# Patient Record
Sex: FEMALE | Employment: FULL TIME | URBAN - NONMETROPOLITAN AREA
[De-identification: names, ages, dates, MRNs, and addresses within clinical notes are randomized per-mention and may not be internally consistent; named-entity substitution may affect disease eponyms.]

---

## 2024-01-26 ENCOUNTER — HOSPITAL ENCOUNTER (EMERGENCY)
Facility: HOSPITAL | Age: 19
Discharge: HOME | End: 2024-01-27
Attending: EMERGENCY MEDICINE
Payer: COMMERCIAL

## 2024-01-26 ENCOUNTER — APPOINTMENT (EMERGENCY)
Dept: RADIOLOGY | Facility: HOSPITAL | Age: 19
End: 2024-01-26
Payer: COMMERCIAL

## 2024-01-26 VITALS
RESPIRATION RATE: 18 BRPM | TEMPERATURE: 98.6 F | DIASTOLIC BLOOD PRESSURE: 79 MMHG | WEIGHT: 102 LBS | OXYGEN SATURATION: 96 % | HEART RATE: 126 BPM | SYSTOLIC BLOOD PRESSURE: 138 MMHG

## 2024-01-26 DIAGNOSIS — R06.2 WHEEZING: ICD-10-CM

## 2024-01-26 DIAGNOSIS — J45.41 MODERATE PERSISTENT ASTHMA WITH EXACERBATION: Primary | ICD-10-CM

## 2024-01-26 DIAGNOSIS — R11.0 NAUSEA: ICD-10-CM

## 2024-01-26 LAB
FLUAV RNA RESP QL NAA+PROBE: NEGATIVE
FLUBV RNA RESP QL NAA+PROBE: NEGATIVE
RSV RNA RESP QL NAA+PROBE: NEGATIVE
SARS-COV-2 RNA RESP QL NAA+PROBE: NEGATIVE

## 2024-01-26 PROCEDURE — 94760 N-INVAS EAR/PLS OXIMETRY 1: CPT

## 2024-01-26 PROCEDURE — 0241U HB NFCT DS VIR RESP RNA 4 TRGT: CPT | Performed by: EMERGENCY MEDICINE

## 2024-01-26 PROCEDURE — 71045 X-RAY EXAM CHEST 1 VIEW: CPT

## 2024-01-26 PROCEDURE — 94664 DEMO&/EVAL PT USE INHALER: CPT

## 2024-01-26 PROCEDURE — 94644 CONT INHLJ TX 1ST HOUR: CPT

## 2024-01-26 RX ORDER — SODIUM CHLORIDE FOR INHALATION 0.9 %
12 VIAL, NEBULIZER (ML) INHALATION ONCE
Status: COMPLETED | OUTPATIENT
Start: 2024-01-26 | End: 2024-01-26

## 2024-01-26 RX ORDER — ONDANSETRON 4 MG/1
4 TABLET, ORALLY DISINTEGRATING ORAL ONCE
Status: COMPLETED | OUTPATIENT
Start: 2024-01-27 | End: 2024-01-27

## 2024-01-26 RX ORDER — ALBUTEROL SULFATE 90 UG/1
2 AEROSOL, METERED RESPIRATORY (INHALATION) EVERY 6 HOURS PRN
Qty: 18 G | Refills: 2 | Status: SHIPPED | OUTPATIENT
Start: 2024-01-26

## 2024-01-26 RX ORDER — PREDNISONE 20 MG/1
40 TABLET ORAL DAILY
Qty: 10 TABLET | Refills: 2 | Status: SHIPPED | OUTPATIENT
Start: 2024-01-26

## 2024-01-26 RX ADMIN — Medication 12 ML: at 22:35

## 2024-01-26 RX ADMIN — PREDNISONE 50 MG: 20 TABLET ORAL at 22:30

## 2024-01-26 RX ADMIN — ALBUTEROL SULFATE 10 MG: 2.5 SOLUTION RESPIRATORY (INHALATION) at 22:35

## 2024-01-26 RX ADMIN — IPRATROPIUM BROMIDE 1 MG: 0.5 SOLUTION RESPIRATORY (INHALATION) at 22:35

## 2024-01-27 RX ORDER — ONDANSETRON 4 MG/1
4 TABLET, ORALLY DISINTEGRATING ORAL EVERY 8 HOURS PRN
Qty: 20 TABLET | Refills: 0 | Status: SHIPPED | OUTPATIENT
Start: 2024-01-27

## 2024-01-27 RX ADMIN — ONDANSETRON 4 MG: 4 TABLET, ORALLY DISINTEGRATING ORAL at 00:07

## 2024-01-27 NOTE — DISCHARGE INSTRUCTIONS
Take prednisone for the next 5 days.  Use inhaler as needed 2 puffs every 4-6 hours  Contact  clinic to establish care with a PCP and/or contact [facility] pulmonary medicine to establish follow-up with a respiratory specialist as they may be able to provide chronic follow-up options, further medications, refills, nebulizers.    Prescription for albuterol inhaler, steroid, spacer for inhaler and referrals for PCP and pulmonary medicine ordered.

## 2024-01-27 NOTE — ED PROVIDER NOTES
History  Chief Complaint   Patient presents with    Shortness of Breath     Pt states that she has been having bad asthma lately with increased SOB today. Pt states she is unable to sleep at night without having an asthma attack. Complains of chest pain        Shortness of Breath  Associated symptoms: cough and wheezing    Associated symptoms: no chest pain, no fever, no headaches, no sore throat and no vomiting    This is a 18-year-old female she reports a past medical history significant for asthma she has been using over-the-counter inhaler for some time now.  She denies a history of admissions, BiPAP, intubations.  She denies tobacco use.  She reports she has been having chronic asthma symptoms including wheezing, shortness of breath, nocturnal coughing throughout the winter season beginning around November.  She denies any other changes in health.  She has been using the inhaler with some relief has not sought any recent evaluations by pulmonology or PCP.  Feels like her symptoms have worsened in the last several days.  Denies productive cough denies fevers, chills denies chest pain denies syncope lightheadedness, dizziness, leg swelling.  Denies history of venous thromboembolism.    None       History reviewed. No pertinent past medical history.    History reviewed. No pertinent surgical history.    History reviewed. No pertinent family history.  I have reviewed and agree with the history as documented.    E-Cigarette/Vaping    E-Cigarette Use Never User      E-Cigarette/Vaping Substances     Social History     Tobacco Use    Smoking status: Never    Smokeless tobacco: Never   Vaping Use    Vaping status: Never Used   Substance Use Topics    Alcohol use: Never    Drug use: Yes     Types: Marijuana       Review of Systems   Constitutional:  Positive for activity change. Negative for chills and fever.   HENT:  Negative for congestion, sore throat and voice change.    Respiratory:  Positive for cough, chest  tightness, shortness of breath and wheezing. Negative for apnea, choking and stridor.    Cardiovascular:  Negative for chest pain, palpitations and leg swelling.   Gastrointestinal:  Negative for nausea and vomiting.   Neurological:  Negative for dizziness, syncope, light-headedness, numbness and headaches.       Physical Exam  Physical Exam  Vitals and nursing note reviewed.   Constitutional:       General: She is not in acute distress.     Appearance: She is well-developed. She is not ill-appearing, toxic-appearing or diaphoretic.   HENT:      Head: Normocephalic and atraumatic.      Mouth/Throat:      Mouth: Mucous membranes are moist.      Pharynx: Oropharynx is clear.   Eyes:      Extraocular Movements: Extraocular movements intact.      Pupils: Pupils are equal, round, and reactive to light.   Neck:      Vascular: No JVD.      Trachea: No tracheal deviation.   Cardiovascular:      Rate and Rhythm: Regular rhythm. Tachycardia present.      Heart sounds: No murmur heard.     No friction rub. No gallop.   Pulmonary:      Effort: Accessory muscle usage present. No tachypnea or respiratory distress.      Breath sounds: No stridor. Examination of the right-upper field reveals wheezing. Examination of the left-upper field reveals wheezing. Examination of the right-middle field reveals wheezing. Examination of the left-middle field reveals wheezing. Examination of the right-lower field reveals wheezing. Examination of the left-lower field reveals wheezing. Wheezing present. No decreased breath sounds, rhonchi or rales.      Comments: Speaking in full sentences.  Lies supine without difficulty (no orthopnea).   Chest:      Chest wall: No tenderness.   Abdominal:      Palpations: Abdomen is soft.   Musculoskeletal:      Right lower leg: No tenderness. No edema.      Left lower leg: No tenderness. No edema.   Skin:     General: Skin is warm and dry.      Capillary Refill: Capillary refill takes less than 2 seconds.       Coloration: Skin is not cyanotic.   Neurological:      General: No focal deficit present.      Mental Status: She is alert and oriented to person, place, and time.         Vital Signs  ED Triage Vitals   Temperature Pulse Respirations Blood Pressure SpO2   01/26/24 2157 01/26/24 2150 01/26/24 2150 01/26/24 2150 01/26/24 2150   98.6 °F (37 °C) (!) 126 18 138/79 95 %      Temp Source Heart Rate Source Patient Position - Orthostatic VS BP Location FiO2 (%)   01/26/24 2157 01/26/24 2150 01/26/24 2150 01/26/24 2150 --   Temporal Monitor Lying Left arm       Pain Score       --                  Vitals:    01/26/24 2150   BP: 138/79   Pulse: (!) 126   Patient Position - Orthostatic VS: Lying         Visual Acuity      ED Medications  Medications   albuterol inhalation solution 10 mg (10 mg Nebulization Given 1/26/24 2235)   ipratropium (ATROVENT) 0.02 % inhalation solution 1 mg (1 mg Nebulization Given 1/26/24 2235)   sodium chloride 0.9 % inhalation solution 12 mL (12 mL Nebulization Given 1/26/24 2235)   predniSONE tablet 50 mg (50 mg Oral Given 1/26/24 2230)       Diagnostic Studies  Results Reviewed       Procedure Component Value Units Date/Time    FLU/RSV/COVID - if FLU/RSV clinically relevant [[id number]]  (Normal) Collected: 01/26/24 2204    Lab Status: Final result Specimen: Nares from Nose Updated: 01/26/24 2251     SARS-CoV-2 Negative     INFLUENZA A PCR Negative     INFLUENZA B PCR Negative     RSV PCR Negative    Narrative:      FOR PEDIATRIC PATIENTS - copy/paste COVID Guidelines URL to browser: [email or web address]    SARS-CoV-2 assay is a Nucleic Acid Amplification assay intended for the  qualitative detection of nucleic acid from SARS-CoV-2 in nasopharyngeal  swabs. Results are for the presumptive identification of SARS-CoV-2 RNA.    Positive results are indicative of infection with SARS-CoV-2, the virus  causing COVID-19, but do not rule out bacterial  infection or co-infection  with other viruses. Laboratories within the [place] and its  territories are required to report all positive results to the appropriate  public health authorities. Negative results do not preclude SARS-CoV-2  infection and should not be used as the sole basis for treatment or other  patient management decisions. Negative results must be combined with  clinical observations, patient history, and epidemiological information.  This test has not been FDA cleared or approved.    This test has been authorized by FDA under an Emergency Use Authorization  (EUA). This test is only authorized for the duration of time the  declaration that circumstances exist justifying the authorization of the  emergency use of an in vitro diagnostic tests for detection of SARS-CoV-2  virus and/or diagnosis of COVID-19 infection under section 564(b)(1) of  the Act, 21 U.S.C. 360bbb-3(b)(1), unless the authorization is terminated  or revoked sooner. The test has been validated but independent review by FDA  and CLIA is pending.    Test performed using [employer] GeneXpert: This RT-PCR assay targets N2,  a region unique to SARS-CoV-2. A conserved region in the E-gene was chosen  for pan-Sarbecovirus detection which includes SARS-CoV-2.    According to CMS-2020-01-R, this platform meets the definition of high-throughput technology.                   XR chest 1 view portable   ED Interpretation by Ga Gutiérrez DO (01/26 2229)   1 view x-ray of chest turbid by myself pending official radiology report.  No pneumothorax seen.  Hyperinflation consistent with chronic obstructive pattern.  No pneumonia or pleural effusion seen.                 Procedures  CriticalCare Time    Date/Time: 1/26/2024 11:16 PM    Performed by: Ga Gutiérrez DO  Authorized by: Ga Gutiérrez DO    Critical care provider statement:     Critical care time (minutes):  35    Critical care time was exclusive of:  Separately  "billable procedures and treating other patients and teaching time    Critical care was necessary to treat or prevent imminent or life-threatening deterioration of the following conditions:  Respiratory failure    Critical care was time spent personally by me on the following activities:  Obtaining history from patient or surrogate, development of treatment plan with patient or surrogate, examination of patient, evaluation of patient's response to treatment, re-evaluation of patient's condition, ordering and review of radiographic studies and ordering and performing treatments and interventions    I assumed direction of critical care for this patient from another provider in my specialty: no             ED Course  ED Course as of 01/26/24 2342   Fri Jan 26, 2024 2214 EKG interpreted by myself.  EKG dated 1/26/2024 at 2210 demonstrates sinus tachycardia 106 bpm, normal NM, QRS, QTc durations, right axis deviation, pulmonary disease pattern, no STEMI.   2214 No prior EKG for comparison.         CRAFFT      Flowsheet Row Most Recent Value   CRAFFT Initial Screen: During the past 12 months, did you:    1. Drink any alcohol (more than a few sips)?  No Filed at: 01/26/2024 2153   2. Smoke any marijuana or hashish No Filed at: 01/26/2024 2153   3. Use anything else to get high? (\"anything else\" includes illegal drugs, over the counter and prescription drugs, and things that you sniff or 'valencia')? No Filed at: 01/26/2024 2153                                            Medical Decision Making  This is a 18-year-old female who reports a history of asthma who presents with acute on chronic wheezing, chest tightness, shortness of breath with exam, history, presentation highly consistent with acute asthma exacerbation.  Chart review reveals limited outpatient follow-up and she has been managing her symptoms within over-the-counter bronchodilator inhaler.  She is not distressed to the point that she requires BiPAP but she does " have low normal oxygen ranging from 91 to 96% and diffuse inspiratory and expiratory wheezing requiring heart neb therapy, steroid administration.  She is improved on reassessment she is counseled extensively on use of MDI, will order/provide spacer for discharge, referral for PCP and referral for pulmonary and counseled on return to ED precautions.  Will discharge with prednisone burst sent to pharmacy.  I do not suspect PE despite the tachycardia and complaint of shortness of breath as the tachycardia is well explained by her short acting beta agonist use, she has wheezing diffusely on exam and her x-ray reveals a pulmonary disease/hyperinflated pattern as well as her EKG.  If she were to be experiencing hypotension hypoxia tachycardia tachypnea with clear lungs and no history of asthma this would be of more concern but given she is been experiencing the symptoms chronically for some time and the worsened is typical with asthma this time of year this is not worrisome for other etiologies at this time.  Also, she had marked improvement in her symptoms her breathing with bronchodilator therapy which would not address other causes such as PE.    Problems Addressed:  Moderate persistent asthma with exacerbation: acute illness or injury  Wheezing: chronic illness or injury with exacerbation, progression, or side effects of treatment    Amount and/or Complexity of Data Reviewed  Radiology: ordered and independent interpretation performed.  ECG/medicine tests: ordered and independent interpretation performed. Decision-making details documented in ED Course.    Risk  Prescription drug management.             Disposition  Final diagnoses:   Moderate persistent asthma with exacerbation   Wheezing     Time reflects when diagnosis was documented in both MDM as applicable and the Disposition within this note       Time User Action Codes Description Comment    1/26/2024 11:02 PM Ga Gutiérrez Add [J45.41] Moderate  persistent asthma with exacerbation     1/26/2024 11:02 PM Ga Gutiérrez Add [R06.2] Wheezing           ED Disposition       ED Disposition   Discharge    Condition   Stable    Date/Time   Fri Jan 26, 2024 2342    Comment   Cornelio Mcginnis discharge to home/self care.                   Follow-up Information       Follow up With Specialties Details Why Contact Info Additional Information    [facility] Family Medicine Schedule an appointment as soon as possible for a visit  As needed to establish care with a primary care provider [address] [place] [phone] [facility], [address] [place] [address], [phone]    [facility] Pulmonology Schedule an appointment as soon as possible for a visit  for follow up with PULMONARY SPECIALIST [address] [place] [phone] [facility] Pulmonary [facility], [address] [place] [phone]            Patient's Medications   Discharge Prescriptions    ALBUTEROL (PROAIR HFA) 90 MCG/ACT INHALER    Inhale 2 puffs every 6 (six) hours as needed for wheezing       Start Date: 1/26/2024 End Date: --       Order Dose: 2 puffs       Quantity: 18 g    Refills: 2    PREDNISONE 20 MG TABLET    Take 2 tablets (40 mg total) by mouth daily       Start Date: 1/26/2024 End Date: --       Order Dose: 40 mg       Quantity: 10 tablet    Refills: 2       Outpatient Discharge Orders   Spacer Device for Inhaler       PDMP Review       None            ED Provider  Electronically Signed by             Ga Gutiérrez,   01/26/24 2322       Ga Gutiérrez,   01/26/24 2342

## 2024-02-01 ENCOUNTER — TELEPHONE (OUTPATIENT)
Age: 19
End: 2024-02-01